# Patient Record
(demographics unavailable — no encounter records)

---

## 2024-11-13 NOTE — PHYSICAL EXAM
[Well Developed] : well developed [Well Nourished] : well nourished [Ill-Appearing] : ill-appearing [Normal Conjunctiva] : normal conjunctiva [Normal Venous Pressure] : normal venous pressure [No Carotid Bruit] : no carotid bruit [Normal S1, S2] : normal S1, S2 [No Murmur] : no murmur [No Rub] : no rub [No Gallop] : no gallop [Clear Lung Fields] : clear lung fields [Good Air Entry] : good air entry [No Respiratory Distress] : no respiratory distress  [Soft] : abdomen soft [Non Tender] : non-tender [No Masses/organomegaly] : no masses/organomegaly [Normal Bowel Sounds] : normal bowel sounds [Normal Gait] : normal gait [No Edema] : no edema [No Cyanosis] : no cyanosis [No Clubbing] : no clubbing [No Rash] : no rash [Moves all extremities] : moves all extremities [No Focal Deficits] : no focal deficits [Normal Speech] : normal speech [Alert and Oriented] : alert and oriented [Normal memory] : normal memory

## 2024-11-13 NOTE — DISCUSSION/SUMMARY
[FreeTextEntry1] : stable exam CAD stable, h/o pci, continue secondary prevention HTN stable on meds Lipids stable on statin [EKG obtained to assist in diagnosis and management of assessed problem(s)] : EKG obtained to assist in diagnosis and management of assessed problem(s)

## 2024-12-18 NOTE — CARDIOLOGY SUMMARY
[de-identified] : 10/28/21 NST: mod size anterior wall defect (new compared to 9/2020 normal perfusion), EF 43%, anterior hypokinesis at rest. Stress EKG 0-0.5 mm upsloping ST depression (leads not specified)  [de-identified] : 11/20/24 TTE: EF 30-35%, global LV hypokinesis, mildly dilated LV (LVIDd 5.2cm), Normal RV size and function. Aortic sclerosis without significant stenosis. IVC normal in size and collapsing.  8/7/24 TTE: LVEF 49%, mildly reduced systolic function, mild-mod MR, LA mildly dilated, mild AR  10/21/21 TTE: LVEF 41%, global hypokinesis, LVH, LV Diastolic Dysfunction, mod MR, mild-mod AR, normal RVSF, RVSP 32 mm Hg, no pericardial effusion. 10/21/21 TTE: LVEF 41%, global hypokinesis, LVH, LV Diastolic Dysfunction, mod MR, mild-mod AR, normal RVSF, RVSP 32 mm Hg, no pericardial effusion.  10/21/21 TTE: LVEF 41%, global hypokinesis, LVH, LV Diastolic Dysfunction, mod MR, mild-mod AR, normal RVSF, RVSP 32 mm Hg, no pericardial effusion.  10/21/21 TTE: LVEF 41%, global hypokinesis, LVH, LV Diastolic Dysfunction, mod MR, mild-mod AR, normal RVSF, RVSP 32 mm Hg, no pericardial effusion.  [de-identified] :  11/22/24 LHC: normal LM, 95% ISR of mLAD, 30% Cx, mild diffuse disease of RCCA, LVEF 40%, LVEDP 2 mmHg  11/22/24 RHC: /69/89 HR 82, RA 2, PA 27/8/15, PCWP 3, AO 95%, PA 60%, CO/CI (F) 3.49/2.05, SVR 1990 dsc, PVR 3.44 posey  11/22/21 LHC: Non-obstructive CAD. Patent p/mLAD stents, mLAD 30-50% (iFR 0.91), LCx mild disease, RCA mild disease; LVEDP 27mmHg. R CFA s/p angioseal

## 2024-12-18 NOTE — ASSESSMENT
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 He is ACC/AHA Stage C, NYHA Class III and euvolemic but mistakenly stopped GDMT. I have recommended the followin. HFrEF - Stable and compensated. Recent decline of LVEF likely in setting of ischemia, now revascularized. Resume Entresto 24-26 mg BID, Farxiga 10 mg QD and Spironolactone 12.5 mg QD as prescribed. Continue Toprol XL 50 mg QD. No loop diuretic requirement. Will determine need for primary prevention device based on cardiac function once on max tolerated GDMT. Labs from  with K 4.6, Cr 1.5 and pro-BNP from  was 2600. Labs today with K 4.6, Cr 1.6 and pro-BNP 3,080 (from 2,600 in November). Labs in one week.   2. CAD - No symptoms of active ischemia. Instructed to resume Brlinta. Continue ASA and Statin. Also on above BB, ARB and MRA. Follows with Dr. Bazzi.   3. Bradycardia - Episodes of bradycardia noted while hospitalized. HR in office today 70s on Toprol XL 50 mg QD. Plans to turn in MCOT . Scheduled to follow-up with EP, Dr. Bagley in February.   4. SVT - Underwent AVNRT ablation in . Scheduled to follow-up with EP as above.   5. CKD - baseline Cr 1.3-1.4. Labs today with Cr of 1.6. Resuming SGLT2-i as above to delay further progression.  6. Follow-up with the HF ACP in 2 weeks and Dr. Shay in 4-6 weeks.

## 2024-12-18 NOTE — PHYSICAL EXAM
[No Xanthelasma] : no xanthelasma [Soft] : abdomen soft [Non Tender] : non-tender [Normal Bowel Sounds] : normal bowel sounds [Normal Radial B/L] : normal radial B/L [Normal] : alert and oriented, normal memory [de-identified] : No perioral cyanosis, MMM  [de-identified] : JVP 6-8 cm H2O, no HJR  [de-identified] : Warm peripherally

## 2024-12-18 NOTE — CARDIOLOGY SUMMARY
[de-identified] : 10/28/21 NST: mod size anterior wall defect (new compared to 9/2020 normal perfusion), EF 43%, anterior hypokinesis at rest. Stress EKG 0-0.5 mm upsloping ST depression (leads not specified)  [de-identified] : 11/20/24 TTE: EF 30-35%, global LV hypokinesis, mildly dilated LV (LVIDd 5.2cm), Normal RV size and function. Aortic sclerosis without significant stenosis. IVC normal in size and collapsing.  8/7/24 TTE: LVEF 49%, mildly reduced systolic function, mild-mod MR, LA mildly dilated, mild AR  10/21/21 TTE: LVEF 41%, global hypokinesis, LVH, LV Diastolic Dysfunction, mod MR, mild-mod AR, normal RVSF, RVSP 32 mm Hg, no pericardial effusion. 10/21/21 TTE: LVEF 41%, global hypokinesis, LVH, LV Diastolic Dysfunction, mod MR, mild-mod AR, normal RVSF, RVSP 32 mm Hg, no pericardial effusion.  10/21/21 TTE: LVEF 41%, global hypokinesis, LVH, LV Diastolic Dysfunction, mod MR, mild-mod AR, normal RVSF, RVSP 32 mm Hg, no pericardial effusion.  10/21/21 TTE: LVEF 41%, global hypokinesis, LVH, LV Diastolic Dysfunction, mod MR, mild-mod AR, normal RVSF, RVSP 32 mm Hg, no pericardial effusion.  [de-identified] :  11/22/24 LHC: normal LM, 95% ISR of mLAD, 30% Cx, mild diffuse disease of RCCA, LVEF 40%, LVEDP 2 mmHg  11/22/24 RHC: /69/89 HR 82, RA 2, PA 27/8/15, PCWP 3, AO 95%, PA 60%, CO/CI (F) 3.49/2.05, SVR 1990 dsc, PVR 3.44 posey  11/22/21 LHC: Non-obstructive CAD. Patent p/mLAD stents, mLAD 30-50% (iFR 0.91), LCx mild disease, RCA mild disease; LVEDP 27mmHg. R CFA s/p angioseal

## 2024-12-18 NOTE — HISTORY OF PRESENT ILLNESS
[FreeTextEntry1] : Mr. Escobar is a 82 year old man with HFrEF (LVIDd 5.2 cm, LVEF 30-35%), CAD c/b MI s/p DARIUSZ (2006 and 11/2024), SVT s/p AVNRT ablation (3/6/23), HTN, CKD (b/l Cr 1.3-1.4), carotid artery stenosis and anxiety/depression who presents for discharge follow-up.   Hospitalized 11/20 - 12/5/24 at [facility] for SOB after recent flu like symptoms, found to be hypertensive and with pulmonary edema. Historically with midrange LVEF but TTE revealing reduction to 30-35%. He was diuresed but developed ROSA thought to be in the setting of overdiuresis. L/RHC 11/22 revealing 95% ISR of mLAD and underwent DARIUSZ x1. RHC with low filling pressures and Milena CI of 2.05 in the setting of elevated SVR of 1990. He was discharged to [facility] at a weight of 156.4 lbs on Entresto 24-26 mg BID, Toprol XL 50 mg QD, Farxiga 10 mg QD and Spironolactone 25 mg QD. He was also placed on MCOT on discharge for episodes of bradycardia with near syncope.   He returned home from rehab 12/11 and notably, did not continue Entresto, Farxiga, Spironolactone and Brilinta as he had questions about new medications. He is taking remainder of medications as prescribed. Otherwise, he feels back to his baseline. Largely inactive but reports primary limitation on flat ground is from LE weakness. Notes dyspnea on inclines. He does not own a BP machine. Continues to wear MCOT and will be returning 12/26. On home scale, weight stable between 157-158 lbs. Denies CP, palpitations, orthopnea, PND, ABD distention and LE swelling.

## 2024-12-18 NOTE — HISTORY OF PRESENT ILLNESS
[FreeTextEntry1] : Mr. Escobar is a 82 year old man with HFrEF (LVIDd 5.2 cm, LVEF 30-35%), CAD c/b MI s/p DARIUSZ (2006 and 11/2024), SVT s/p AVNRT ablation (3/6/23), HTN, CKD (b/l Cr 1.3-1.4), carotid artery stenosis and anxiety/depression who presents for discharge follow-up.   Hospitalized 11/20 - 12/5/24 at [facility] for SOB after recent flu like symptoms, found to be hypertensive and with pulmonary edema. Historically with midrange LVEF but TTE revealing reduction to 30-35%. He was diuresed but developed ROSA thought to be in the setting of overdiuresis. L/RHC 11/22 revealing 95% ISR of mLAD and underwent DARIUSZ x1. RHC with low filling pressures and Milena CI of 2.05 in the setting of elevated SVR of 1990. He was discharged to [facility] at a weight of 156.4 lbs on Entresto 24-26 mg BID, Toprol XL 50 mg QD, Farxiga 10 mg QD and Spironolactone 25 mg QD. He was also placed on MCOT on discharge for episodes of bradycardia with near syncope.   He returned home from rehab 12/11 and notably, did not continue Entresto, Farxiga, Spironolactone and Brilinta as he had questions about new medications. He is taking remainder of medications as prescribed. Otherwise, he feels back to his baseline. Largely inactive but reports primary limitation on flat ground is from LE weakness. Notes dyspnea on inclines. He does not own a BP machine. Continues to wear MCOT and will be returning 12/26. On home scale, weight stable between 157-158 lbs. Denies CP, palpitations, orthopnea, PND, ABD distention and LE swelling.

## 2024-12-18 NOTE — PHYSICAL EXAM
[No Xanthelasma] : no xanthelasma [Soft] : abdomen soft [Non Tender] : non-tender [Normal Bowel Sounds] : normal bowel sounds [Normal Radial B/L] : normal radial B/L [Normal] : alert and oriented, normal memory [de-identified] : No perioral cyanosis, MMM  [de-identified] : JVP 6-8 cm H2O, no HJR  [de-identified] : Warm peripherally

## 2024-12-30 NOTE — PHYSICAL EXAM
[No Xanthelasma] : no xanthelasma [Soft] : abdomen soft [Non Tender] : non-tender [Normal Bowel Sounds] : normal bowel sounds [Normal Radial B/L] : normal radial B/L [Normal] : alert and oriented, normal memory [de-identified] : No perioral cyanosis, MMM  [de-identified] : JVP 6-8 cm H2O, no HJR  [de-identified] : Warm peripherally

## 2024-12-30 NOTE — PHYSICAL EXAM
[No Xanthelasma] : no xanthelasma [Soft] : abdomen soft [Non Tender] : non-tender [Normal Bowel Sounds] : normal bowel sounds [Normal Radial B/L] : normal radial B/L [Normal] : alert and oriented, normal memory [de-identified] : No perioral cyanosis, MMM  [de-identified] : JVP 6-8 cm H2O, no HJR  [de-identified] : Warm peripherally

## 2024-12-30 NOTE — CARDIOLOGY SUMMARY
[de-identified] : 10/28/21 NST: mod size anterior wall defect (new compared to 9/2020 normal perfusion), EF 43%, anterior hypokinesis at rest. Stress EKG 0-0.5 mm upsloping ST depression (leads not specified)  [de-identified] : 11/20/24 TTE: EF 30-35%, global LV hypokinesis, mildly dilated LV (LVIDd 5.2cm), Normal RV size and function. Aortic sclerosis without significant stenosis. IVC normal in size and collapsing.  8/7/24 TTE: LVEF 49%, mildly reduced systolic function, mild-mod MR, LA mildly dilated, mild AR  10/21/21 TTE: LVEF 41%, global hypokinesis, LVH, LV Diastolic Dysfunction, mod MR, mild-mod AR, normal RVSF, RVSP 32 mm Hg, no pericardial effusion. 10/21/21 TTE: LVEF 41%, global hypokinesis, LVH, LV Diastolic Dysfunction, mod MR, mild-mod AR, normal RVSF, RVSP 32 mm Hg, no pericardial effusion.  10/21/21 TTE: LVEF 41%, global hypokinesis, LVH, LV Diastolic Dysfunction, mod MR, mild-mod AR, normal RVSF, RVSP 32 mm Hg, no pericardial effusion.  10/21/21 TTE: LVEF 41%, global hypokinesis, LVH, LV Diastolic Dysfunction, mod MR, mild-mod AR, normal RVSF, RVSP 32 mm Hg, no pericardial effusion.  [de-identified] :  11/22/24 LHC: normal LM, 95% ISR of mLAD, 30% Cx, mild diffuse disease of RCCA, LVEF 40%, LVEDP 2 mmHg  11/22/24 RHC: /69/89 HR 82, RA 2, PA 27/8/15, PCWP 3, AO 95%, PA 60%, CO/CI (F) 3.49/2.05, SVR 1990 dsc, PVR 3.44 posey  11/22/21 LHC: Non-obstructive CAD. Patent p/mLAD stents, mLAD 30-50% (iFR 0.91), LCx mild disease, RCA mild disease; LVEDP 27mmHg. R CFA s/p angioseal

## 2024-12-30 NOTE — HISTORY OF PRESENT ILLNESS
[FreeTextEntry1] : HF-Dr. Shay Cards- Dr. Bazzi  Mr. 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   Hospitalized 11/20 - 12/5/24 at [facility] for SOB after recent flu like symptoms, found to be hypertensive and with pulmonary edema. Historically with midrange LVEF but TTE revealing reduction to 30-35%. He was diuresed but developed ROSA thought to be in the setting of overdiuresis. L/RHC 11/22 revealing 95% ISR of mLAD and underwent DARIUSZ x1. RHC with low filling pressures and Milena CI of 2.05 in the setting of elevated SVR of 1990. He was discharged to [facility] at a weight of 156.4 lbs on Entresto 24-26 mg BID, Toprol XL 50 mg QD, Farxiga 10 mg QD and Spironolactone 25 mg QD. He was also placed on MCOT on discharge for episodes of bradycardia with near syncope.   He confirms his medications are correct, but has not taken them today. He takes them at lunch. He lives on the [facility] and took a cab here today but plans to walk home. He can walk five blocks but will rest after 2 blocks and is limited largely by fatigue and rarely short of breath. Otherwise, he feels back to his baseline. Continues to notes dyspnea on inclines.  Denies CP, palpitations, orthopnea, PND, ABD distention and LE swelling. Completed MCOT on 12/26. On home scale, weight stable between 157-158 lbs. He does not own a BP machine. Lastly he limits his po hydration to the hospital provided water bottle <=1 L/day.             Attending Only

## 2024-12-30 NOTE — HISTORY OF PRESENT ILLNESS
[FreeTextEntry1] : HF-Dr. Shay Cards- Dr. Bazzi  Mr. 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   Hospitalized 11/20 - 12/5/24 at [facility] for SOB after recent flu like symptoms, found to be hypertensive and with pulmonary edema. Historically with midrange LVEF but TTE revealing reduction to 30-35%. He was diuresed but developed ROSA thought to be in the setting of overdiuresis. L/RHC 11/22 revealing 95% ISR of mLAD and underwent DARIUSZ x1. RHC with low filling pressures and Milena CI of 2.05 in the setting of elevated SVR of 1990. He was discharged to [facility] at a weight of 156.4 lbs on Entresto 24-26 mg BID, Toprol XL 50 mg QD, Farxiga 10 mg QD and Spironolactone 25 mg QD. He was also placed on MCOT on discharge for episodes of bradycardia with near syncope.   He confirms his medications are correct, but has not taken them today. He takes them at lunch. He lives on the [facility] and took a cab here today but plans to walk home. He can walk five blocks but will rest after 2 blocks and is limited largely by fatigue and rarely short of breath. Otherwise, he feels back to his baseline. Continues to notes dyspnea on inclines.  Denies CP, palpitations, orthopnea, PND, ABD distention and LE swelling. Completed MCOT on 12/26. On home scale, weight stable between 157-158 lbs. He does not own a BP machine. Lastly he limits his po hydration to the hospital provided water bottle <=1 L/day.

## 2024-12-30 NOTE — CARDIOLOGY SUMMARY
[de-identified] : 10/28/21 NST: mod size anterior wall defect (new compared to 9/2020 normal perfusion), EF 43%, anterior hypokinesis at rest. Stress EKG 0-0.5 mm upsloping ST depression (leads not specified)  [de-identified] : 11/20/24 TTE: EF 30-35%, global LV hypokinesis, mildly dilated LV (LVIDd 5.2cm), Normal RV size and function. Aortic sclerosis without significant stenosis. IVC normal in size and collapsing.  8/7/24 TTE: LVEF 49%, mildly reduced systolic function, mild-mod MR, LA mildly dilated, mild AR  10/21/21 TTE: LVEF 41%, global hypokinesis, LVH, LV Diastolic Dysfunction, mod MR, mild-mod AR, normal RVSF, RVSP 32 mm Hg, no pericardial effusion. 10/21/21 TTE: LVEF 41%, global hypokinesis, LVH, LV Diastolic Dysfunction, mod MR, mild-mod AR, normal RVSF, RVSP 32 mm Hg, no pericardial effusion.  10/21/21 TTE: LVEF 41%, global hypokinesis, LVH, LV Diastolic Dysfunction, mod MR, mild-mod AR, normal RVSF, RVSP 32 mm Hg, no pericardial effusion.  10/21/21 TTE: LVEF 41%, global hypokinesis, LVH, LV Diastolic Dysfunction, mod MR, mild-mod AR, normal RVSF, RVSP 32 mm Hg, no pericardial effusion.  [de-identified] :  11/22/24 LHC: normal LM, 95% ISR of mLAD, 30% Cx, mild diffuse disease of RCCA, LVEF 40%, LVEDP 2 mmHg  11/22/24 RHC: /69/89 HR 82, RA 2, PA 27/8/15, PCWP 3, AO 95%, PA 60%, CO/CI (F) 3.49/2.05, SVR 1990 dsc, PVR 3.44 posey  11/22/21 LHC: Non-obstructive CAD. Patent p/mLAD stents, mLAD 30-50% (iFR 0.91), LCx mild disease, RCA mild disease; LVEDP 27mmHg. R CFA s/p angioseal

## 2024-12-30 NOTE — ASSESSMENT
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 He is ACC/AHA Stage C, NYHA Class III and euvolemic, reassuringly his medications are correct today. I have recommended the followin. HFrEF - Stable and compensated. Recent decline of LVEF likely in setting of ischemia, now revascularized. Current regimen is Entresto 24-26 mg BID, Farxiga 10 mg QD and Spironolactone 12.5 mg QD and Toprol XL 50 mg QD. Today will increase Entresto to 49/51 mg BID. Will defer BB escalation until Holter has resulted given marked bradycardia during admission. No loop diuretic requirement. In fact, I encouraged him to drink more water (up to 2 L/day) Will determine need for primary prevention device based on cardiac function once on max tolerated GDMT. Referred to cardiac rehab today  (pt selection pending).  Labs : Na 140, K 4.4, Cl 109*, BUN 37, Cr 1.7, pro-BNP 3083-->3132. Check labs on 2025 after increasing Entresto.   2. CAD - No symptoms of active ischemia. Instructed to resume Brlinta. Continue ASA and Statin. Also on above BB, ARB and MRA. Follows with Dr. Bazzi.   3. Bradycardia - Episodes of bradycardia noted while hospitalized. HR in office today 70s on Toprol XL 50 mg QD. MCOT  pending will defer BB titration until resulted. Scheduled to follow-up with EP, Dr. Bagley in February.   4. SVT - Underwent AVNRT ablation in . Scheduled to follow-up with EP as above.   5. CKD - baseline Cr 1.3-1.4. Labs today with Cr of 1.6. Resuming SGLT2-i as above to delay further progression.  6. Complete labs on 2025. Follow-up with the HF ACP in 2 weeks and Dr. Shay in 4-6 weeks.

## 2025-01-08 NOTE — DISCUSSION/SUMMARY
[FreeTextEntry1] : stable exam/ chart and hospital records reviewed CAD  stable s/p LAD pci cont DAPT CMP- stable, no active chf, will repeat echo on f/u HTN stable Lipids stable

## 2025-01-15 NOTE — CARDIOLOGY SUMMARY
[de-identified] : 10/28/21 NST: mod size anterior wall defect (new compared to 9/2020 normal perfusion), EF 43%, anterior hypokinesis at rest. Stress EKG 0-0.5 mm upsloping ST depression (leads not specified)  [de-identified] : 11/20/24 TTE: EF 30-35%, global LV hypokinesis, mildly dilated LV (LVIDd 5.2cm), Normal RV size and function. Aortic sclerosis without significant stenosis. IVC normal in size and collapsing.  8/7/24 TTE: LVEF 49%, mildly reduced systolic function, mild-mod MR, LA mildly dilated, mild AR  10/21/21 TTE: LVEF 41%, global hypokinesis, LVH, LV Diastolic Dysfunction, mod MR, mild-mod AR, normal RVSF, RVSP 32 mm Hg, no pericardial effusion. 10/21/21 TTE: LVEF 41%, global hypokinesis, LVH, LV Diastolic Dysfunction, mod MR, mild-mod AR, normal RVSF, RVSP 32 mm Hg, no pericardial effusion.  10/21/21 TTE: LVEF 41%, global hypokinesis, LVH, LV Diastolic Dysfunction, mod MR, mild-mod AR, normal RVSF, RVSP 32 mm Hg, no pericardial effusion.  10/21/21 TTE: LVEF 41%, global hypokinesis, LVH, LV Diastolic Dysfunction, mod MR, mild-mod AR, normal RVSF, RVSP 32 mm Hg, no pericardial effusion.  [de-identified] :  11/22/24 LHC: normal LM, 95% ISR of mLAD, 30% Cx, mild diffuse disease of RCCA, LVEF 40%, LVEDP 2 mmHg  11/22/24 RHC: /69/89 HR 82, RA 2, PA 27/8/15, PCWP 3, AO 95%, PA 60%, CO/CI (F) 3.49/2.05, SVR 1990 dsc, PVR 3.44 posey  11/22/21 LHC: Non-obstructive CAD. Patent p/mLAD stents, mLAD 30-50% (iFR 0.91), LCx mild disease, RCA mild disease; LVEDP 27mmHg. R CFA s/p angioseal

## 2025-01-15 NOTE — PHYSICAL EXAM
[No Xanthelasma] : no xanthelasma [Soft] : abdomen soft [Non Tender] : non-tender [Normal Bowel Sounds] : normal bowel sounds [Normal Radial B/L] : normal radial B/L [Normal] : alert and oriented, normal memory [de-identified] : No perioral cyanosis, MMM  [de-identified] : JVP 6-8 cm H2O, no HJR  [de-identified] : Warm peripherally

## 2025-01-15 NOTE — CARDIOLOGY SUMMARY
[de-identified] : 10/28/21 NST: mod size anterior wall defect (new compared to 9/2020 normal perfusion), EF 43%, anterior hypokinesis at rest. Stress EKG 0-0.5 mm upsloping ST depression (leads not specified)  [de-identified] : 11/20/24 TTE: EF 30-35%, global LV hypokinesis, mildly dilated LV (LVIDd 5.2cm), Normal RV size and function. Aortic sclerosis without significant stenosis. IVC normal in size and collapsing.  8/7/24 TTE: LVEF 49%, mildly reduced systolic function, mild-mod MR, LA mildly dilated, mild AR  10/21/21 TTE: LVEF 41%, global hypokinesis, LVH, LV Diastolic Dysfunction, mod MR, mild-mod AR, normal RVSF, RVSP 32 mm Hg, no pericardial effusion. 10/21/21 TTE: LVEF 41%, global hypokinesis, LVH, LV Diastolic Dysfunction, mod MR, mild-mod AR, normal RVSF, RVSP 32 mm Hg, no pericardial effusion.  10/21/21 TTE: LVEF 41%, global hypokinesis, LVH, LV Diastolic Dysfunction, mod MR, mild-mod AR, normal RVSF, RVSP 32 mm Hg, no pericardial effusion.  10/21/21 TTE: LVEF 41%, global hypokinesis, LVH, LV Diastolic Dysfunction, mod MR, mild-mod AR, normal RVSF, RVSP 32 mm Hg, no pericardial effusion.  [de-identified] :  11/22/24 LHC: normal LM, 95% ISR of mLAD, 30% Cx, mild diffuse disease of RCCA, LVEF 40%, LVEDP 2 mmHg  11/22/24 RHC: /69/89 HR 82, RA 2, PA 27/8/15, PCWP 3, AO 95%, PA 60%, CO/CI (F) 3.49/2.05, SVR 1990 dsc, PVR 3.44 posey  11/22/21 LHC: Non-obstructive CAD. Patent p/mLAD stents, mLAD 30-50% (iFR 0.91), LCx mild disease, RCA mild disease; LVEDP 27mmHg. R CFA s/p angioseal

## 2025-01-15 NOTE — ASSESSMENT
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 He is ACC/AHA Stage C, NYHA Class III and euvolemic. I have recommended the followin. HFrEF - Stable and compensated. Recent decline of LVEF likely in setting of ischemia, now revascularized. Current regimen is Entresto 49/51 mg BID, Farxiga 10 mg QD and Spironolactone 12.5 mg QD and Toprol XL 50 mg BID (awaiting MCOT, will email Dr. Bagley to ensure escalation of BB is apropriate) Today will plan for repeat labs on low K diet and after fluid liberalization and will maximize ARNI based on those results. If K remains elevated will discontinue MRA in favor of ARNI escalation.  Will defer BB escalation until Holter has resulted given marked bradycardia during admission. No loop diuretic requirement. In fact, I encouraged him to drink more water (up to 2 L/day) Will determine need for primary prevention device based on cardiac function once on max tolerated GDMT. Referred to cardiac rehab today  (pt selection pending). Labs 2025: Na 141, K 5.1, Cl 108, CO2 17, BUN 44, Cr 1.91 (from 1.7), proBNP 3132-->1799. Check labs with Dr. Bagley on 2/3/2025 (lapslip provided).  2. CAD - No symptoms of active ischemia. Instructed to resume Brilinta. Continue ASA and Statin. Also on above BB, ARB and MRA. Follows with Dr. Bazzi.   3. Bradycardia - Episodes of bradycardia noted while hospitalized. HR in office today 60s on Toprol XL 50 mg BID. MCOT  pending will defer BB titration until resulted. Scheduled to follow-up with EP, Dr. Bagley in February. Emailed EP team today to ensure BB escalation is apropriate.   4. SVT - Underwent AVNRT ablation in . Scheduled to follow-up with EP as above (2/3/2025).   5. CKD - baseline Cr 1.3-1.4. Labs today with Cr of 1.9. Resuming SGLT2-i as above to delay further progression. Fluid liberalization encouraged without overdoing it. Can drink 2 L total daily fluids.   6. Hyperkalemia- K is 5.1 but notably using Ms. Dash often. Low K diet reinforced. He will repeat labs on 2/3/2025 when seeing Dr. Bagley. Labslip ordered. As above would favor ARNI escalation over the continuation of MRA.   7. Complete labs on 2/3/2025. Follow-up with the HF ACP in 2 weeks ( via telehealth) and Dr. Shay in 3/2025 after TTE on max doses of GDMT. Aurelia will speak to Dr. Bazzi about deferring TTE.

## 2025-01-15 NOTE — HISTORY OF PRESENT ILLNESS
[FreeTextEntry1] : HF-Dr. Shay Cards- Dr. Bazzi  Mr. 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   Hospitalized 11/20 - 12/5/24 at [facility] for SOB after recent flu like symptoms, found to be hypertensive and with pulmonary edema. Historically with midrange LVEF but TTE revealing reduction to 30-35%. He was diuresed but developed ROSA thought to be in the setting of overdiuresis. L/RHC 11/22 revealing 95% ISR of mLAD and underwent DARIUSZ x1. RHC with low filling pressures and Milena CI of 2.05 in the setting of elevated SVR of 1990. He was discharged to [facility] at a weight of 156.4 lbs on Entresto 24-26 mg BID, Toprol XL 50 mg QD, Farxiga 10 mg QD and Spironolactone 25 mg QD. He was also placed on MCOT on discharge for episodes of bradycardia with near syncope.   ===== He confirms his medications and but is now taking metoprolol twice? daily but awaiting holter results. Completed MCOT on 12/26 and will follow with EP. Labs did result and demonstrates a rise in Cr now 1.9 with a K 5.1. Admits he has been utilizing Ms. Clay daily to limit salt intake and remains on fluid restrictions.   He lives on the [facility] and took a cab here today but sometimes walks home. He can walk five blocks but will rest after 2 blocks and is limited largely by fatigue and rarely short of breath. Otherwise, he feels back to his baseline. Continues to notes dyspnea on inclines.   Denies CP, palpitations, orthopnea, PND, ABD distention and LE swelling.. On home scale, weight stable between 157-158 lbs. He does not own a BP machine. Lastly he continues to limits his po hydration to the hospital provided water bottle <=1 L/day.

## 2025-01-15 NOTE — HISTORY OF PRESENT ILLNESS
[FreeTextEntry1] : HF-Dr. Shay Cards- Dr. Bazzi  Mr. 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   Hospitalized 11/20 - 12/5/24 at [facility] for SOB after recent flu like symptoms, found to be hypertensive and with pulmonary edema. Historically with midrange LVEF but TTE revealing reduction to 30-35%. He was diuresed but developed ROSA thought to be in the setting of overdiuresis. L/RHC 11/22 revealing 95% ISR of mLAD and underwent DARIUSZ x1. RHC with low filling pressures and Milena CI of 2.05 in the setting of elevated SVR of 1990. He was discharged to [facility] at a weight of 156.4 lbs on Entresto 24-26 mg BID, Toprol XL 50 mg QD, Farxiga 10 mg QD and Spironolactone 25 mg QD. He was also placed on MCOT on discharge for episodes of bradycardia with near syncope.   ===== He confirms his medications and but is now taking metoprolol twice? daily but awaiting holter results. Completed MCOT on 12/26 and will follow with EP. Labs did result and demonstrates a rise in Cr now 1.9 with a K 5.1. Admits he has been utilizing Ms. Clay daily to limit salt intake and remains on fluid restrictions.   He lives on the [facility] and took a cab here today but sometimes walks home. He can walk five blocks but will rest after 2 blocks and is limited largely by fatigue and rarely short of breath. Otherwise, he feels back to his baseline. Continues to notes dyspnea on inclines.   Denies CP, palpitations, orthopnea, PND, ABD distention and LE swelling.. On home scale, weight stable between 157-158 lbs. He does not own a BP machine. Lastly he continues to limits his po hydration to the hospital provided water bottle <=1 L/day.

## 2025-01-15 NOTE — PHYSICAL EXAM
[No Xanthelasma] : no xanthelasma [Soft] : abdomen soft [Non Tender] : non-tender [Normal Bowel Sounds] : normal bowel sounds [Normal Radial B/L] : normal radial B/L [Normal] : alert and oriented, normal memory [de-identified] : No perioral cyanosis, MMM  [de-identified] : JVP 6-8 cm H2O, no HJR  [de-identified] : Warm peripherally

## 2025-02-03 NOTE — PHYSICAL EXAM
[Normal Conjunctiva] : normal conjunctiva [Normal Venous Pressure] : normal venous pressure [No Carotid Bruit] : no carotid bruit [Normal] : normal S1, S2, no murmur, no rub, no gallop [Clear Lung Fields] : clear lung fields [Good Air Entry] : good air entry [No Respiratory Distress] : no respiratory distress  [Soft] : abdomen soft [Non Tender] : non-tender [No Edema] : no edema [Moves all extremities] : moves all extremities [No Focal Deficits] : no focal deficits [Alert and Oriented] : alert and oriented [Normal memory] : normal memory

## 2025-02-04 NOTE — END OF VISIT
[FreeTextEntry3] : I, Dr. Vivar, personally performed the evaluation and management (E/M) services for this new patient. That E/M includes conducting the initial examination, assessing all conditions, and establishing the plan of care. Today, my fellow, Junior Francis, was here to observe my evaluation and management services for this patient to be followed going forward.

## 2025-02-04 NOTE — REVIEW OF SYSTEMS
[Fever] : no fever [Chills] : no chills [SOB] : no shortness of breath [Dyspnea on exertion] : not dyspnea during exertion [Chest Discomfort] : no chest discomfort [Lower Ext Edema] : no extremity edema [Leg Claudication] : no intermittent leg claudication [Palpitations] : no palpitations [Syncope] : no syncope [Cough] : no cough [Wheezing] : no wheezing [Abdominal Pain] : no abdominal pain [Nausea] : no nausea [Vomiting] : no vomiting

## 2025-02-04 NOTE — HISTORY OF PRESENT ILLNESS
[FreeTextEntry1] : Jose Daniel Escobar is an 82 year old man with SVT, CAD s/p PCI, HTN, HLD, BPH, ureteral stones (s/p B/L stents 7/6/2021), carotid artery disease and anxiety/depression who presents to [facility] care. He developed sudden onset chest tightness on the morning of 12/23 which prompted him to present to Dr. morales's office for evaluation. He was found to be in a narrow complex tachycardia and was taken by EMS to the [facility] ED. He was given 6mg of adenosine followed by 12mg by EMS, which terminated the tachycardia to sinus tachycardia. He was found to be COVID (+) and was admitted for further workup. He had not further episodes of SVT while at [facility] and was discharged on 12/24. 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  Prior EP care with Dr. Vivar.   Interval history significant for [facility] 11/22/24 with successful DARIUSZ LAD ISR.  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  Telemetry notable for short runs of likely AT and frequent episodes of symptomatic bradycardia.  Buddy episodes were reproducible when he bends over and preceded by severe SOB/tachypnea and desaturation down to 70s.  Bradycardia resolved when respiratory distress resolved.  He denies any dizziness, presyncope/syncope.    ZioXT 12/12 - 12/26/24: SR (46-), symptoms during NSR, NSVT, non sustained SVT (longest 20 seconds)

## 2025-02-04 NOTE — HISTORY OF PRESENT ILLNESS
[FreeTextEntry1] : Jose Daniel Escobar is an 82 year old man with SVT, CAD s/p PCI, HTN, HLD, BPH, ureteral stones (s/p B/L stents 7/6/2021), carotid artery disease and anxiety/depression who presents to [facility] care. He developed sudden onset chest tightness on the morning of 12/23 which prompted him to present to Dr. morales's office for evaluation. He was found to be in a narrow complex tachycardia and was taken by EMS to the [facility] ED. He was given 6mg of adenosine followed by 12mg by EMS, which terminated the tachycardia to sinus tachycardia. He was found to be COVID (+) and was admitted for further workup. He had not further episodes of SVT while at [facility] and was discharged on 12/24. 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  Prior EP care with Dr. Vivar.   Interval history significant for [facility] 11/22/24 with successful DARIUSZ LAD ISR.  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  Telemetry notable for short runs of likely AT and frequent episodes of symptomatic bradycardia.  Buddy episodes were reproducible when he bends over and preceded by severe SOB/tachypnea and desaturation down to 70s.  Bradycardia resolved when respiratory distress resolved.  He denies any dizziness, presyncope/syncope.    ZioXT 12/12 - 12/26/24: SR (46-), symptoms during NSR, NSVT, non sustained SVT (longest 20 seconds)

## 2025-02-04 NOTE — CARDIOLOGY SUMMARY
[de-identified] : 2/3/25 SR @ 70 bpm, first deg AVB 11/7/23 SR with inc RBBB, PVCs  5/2/23 Sinus  Rhythm @ 63 bpm,  Incomplete right bundle branch block. 02/14/23: sinus rhythm with iRBBB [de-identified] : TTE 1/29/25 1. Moderately reduced left ventricular systolic function, ejection fractin \R\ 35-40%.  2. Global left ventricular hypokinesis.  3. With echocontrast imaging, there is no evidence of left ventricular thrombus.  4. Normal left ventricular size.  5. Normal right ventricular size and systolic function.  6. Normal atria.  7. Aortic sclerosis without significant stenosis.  8. Mild aortic regurgitation.  9. No pericardial effusion. 10. Compared to the previous TTE performed on 11/20/2024, left ventricular function appears slighly improved.  TTE 10/21/21: LVEF 41% with global hypokinesis, LVH, diastolic dysfunction, normal RV size and function, normal LA, nomral RA< mild-mod AI, mod MR with mild MAC, mild TR, trace PI, no pericardial effusion.

## 2025-02-04 NOTE — ADDENDUM
[FreeTextEntry1] : I, Christina Al, am scribing for and the presence of Dr. Bagley the following sections: HPI, PMH,Family/social history, ROS, Physical Exam, Assessment / Plan.   I, Moses Bagley, personally performed the services described in the documentation, reviewed the documentation recorded by the scribe in my presence and it accurately and completely records my words and actions.

## 2025-02-04 NOTE — DISCUSSION/SUMMARY
[FreeTextEntry1] : Jose Daniel Escobar is an 82 year old man with CAD s/p PCI, HTN, HLD, BPH, ureteral stones (s/p B/L stents 7/6/2021), carotid artery disease, anxiety/depression, SVT s/p AVRNT ablation (3/6/23, Dr. Vivar) and bradycardia who presents for follow up  1.  Bradycardia No significant bradycardia on recent ambulatory telemetry Episodes during hospitalization 2/2 respiratory distress vs. vagal component when bending over.  Continue to defer PPM placement at this time  2.  SVT Maintaining sinus rhythm  Non sustained SVT noted on LTM Continue Carvedilol for rate control  F/U 6 months, sooner as needed

## 2025-02-04 NOTE — CARDIOLOGY SUMMARY
[de-identified] : 2/3/25 SR @ 70 bpm, first deg AVB 11/7/23 SR with inc RBBB, PVCs  5/2/23 Sinus  Rhythm @ 63 bpm,  Incomplete right bundle branch block. 02/14/23: sinus rhythm with iRBBB [de-identified] : TTE 1/29/25 1. Moderately reduced left ventricular systolic function, ejection fractin \R\ 35-40%.  2. Global left ventricular hypokinesis.  3. With echocontrast imaging, there is no evidence of left ventricular thrombus.  4. Normal left ventricular size.  5. Normal right ventricular size and systolic function.  6. Normal atria.  7. Aortic sclerosis without significant stenosis.  8. Mild aortic regurgitation.  9. No pericardial effusion. 10. Compared to the previous TTE performed on 11/20/2024, left ventricular function appears slighly improved.  TTE 10/21/21: LVEF 41% with global hypokinesis, LVH, diastolic dysfunction, normal RV size and function, normal LA, nomral RA< mild-mod AI, mod MR with mild MAC, mild TR, trace PI, no pericardial effusion.

## 2025-02-12 NOTE — HISTORY OF PRESENT ILLNESS
[FreeTextEntry1] : HF-Dr. Shay Cards- Dr. Bazzi  Mr. 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   Hospitalized 11/20 - 12/5/24 at [facility] for SOB after recent flu like symptoms, found to be hypertensive and with pulmonary edema. Historically with midrange LVEF but TTE revealing reduction to 30-35%. He was diuresed but developed ROSA thought to be in the setting of overdiuresis. L/RHC 11/22 revealing 95% ISR of mLAD and underwent DARIUSZ x1. RHC with low filling pressures and Milena CI of 2.05 in the setting of elevated SVR of 1990. He was discharged to [facility] at a weight of 156.4 lbs on Entresto 24-26 mg BID, Toprol XL 50 mg QD, Farxiga 10 mg QD and Spironolactone 25 mg QD. He was also placed on MCOT on discharge for episodes of bradycardia with near syncope.   Briefly readmitted to [facility] 1/28-1/29/2025 presenting with acute shortness of breath and hypertension (SBP 200s), brought in by EMS, admitted for acute on chronic systolic heart failure. Likely in the setting of a " a few missed medication doses." He was resumed on medications and discharged on higher doses of ARNI including entresto 97/103 mg BID, [place] 12.5 mg TIW (as per HF notes, but discharge instructions inadvertently said once daily), transitioned from toprol to coreg 12.5 mg BID and farxiga 10 mg daily. Without a standing diuretic requirement.   ======== He presents today with his medication list(s). He forgot to take his medications today. [facility] confirms he is only taking spironolactone 25 mg every other day as the pills are too difficult to cut. He does monitor his BP at home.   He lives on the [facility] and took a cab here today but sometimes walks home. He can walk five blocks but will rest after 2 blocks and is limited largely by fatigue and rarely short of breath. Otherwise, he feels back to his baseline. Continues to notes dyspnea on inclines.   Denies CP, palpitations, orthopnea, PND, ABD distention and LE swelling.. On home scale, weight stable between ~157 lbs. He does not own a BP machine. Lastly he continues to limits his po hydration to the hospital provided water bottle <=1 L/day. Limits potassium rich snacks/foods.

## 2025-02-12 NOTE — CARDIOLOGY SUMMARY
[de-identified] : 12/12 - 12/26/24 ZioXT : SR predominant (min HR 39 bpm, max  bpm, avg HR 71 bpm), symptoms during NSR, NSVT (5 beats), non sustained SVT (longest 20 seconds).  [de-identified] : 10/28/21 NST: mod size anterior wall defect (new compared to 9/2020 normal perfusion), EF 43%, anterior hypokinesis at rest. Stress EKG 0-0.5 mm upsloping ST depression (leads not specified)  [de-identified] : 11/20/24 TTE: EF 30-35%, global LV hypokinesis, mildly dilated LV (LVIDd 5.2cm), Normal RV size and function. Aortic sclerosis without significant stenosis. IVC normal in size and collapsing.  8/7/24 TTE: LVEF 49%, mildly reduced systolic function, mild-mod MR, LA mildly dilated, mild AR  10/21/21 TTE: LVEF 41%, global hypokinesis, LVH, LV Diastolic Dysfunction, mod MR, mild-mod AR, normal RVSF, RVSP 32 mm Hg, no pericardial effusion. 10/21/21 TTE: LVEF 41%, global hypokinesis, LVH, LV Diastolic Dysfunction, mod MR, mild-mod AR, normal RVSF, RVSP 32 mm Hg, no pericardial effusion.  10/21/21 TTE: LVEF 41%, global hypokinesis, LVH, LV Diastolic Dysfunction, mod MR, mild-mod AR, normal RVSF, RVSP 32 mm Hg, no pericardial effusion.  10/21/21 TTE: LVEF 41%, global hypokinesis, LVH, LV Diastolic Dysfunction, mod MR, mild-mod AR, normal RVSF, RVSP 32 mm Hg, no pericardial effusion.  [de-identified] :  11/22/24 LHC: normal LM, 95% ISR of mLAD, 30% Cx, mild diffuse disease of RCCA, LVEF 40%, LVEDP 2 mmHg  11/22/24 RHC: /69/89 HR 82, RA 2, PA 27/8/15, PCWP 3, AO 95%, PA 60%, CO/CI (F) 3.49/2.05, SVR 1990 dsc, PVR 3.44 posey  11/22/21 LHC: Non-obstructive CAD. Patent p/mLAD stents, mLAD 30-50% (iFR 0.91), LCx mild disease, RCA mild disease; LVEDP 27mmHg. R CFA s/p angioseal

## 2025-02-12 NOTE — ASSESSMENT
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 He is ACC/AHA Stage C, NYHA Class III and euvolemic. I have recommended the followin. HFrEF - Stable and compensated. Recent decline of LVEF likely in setting of ischemia, now revascularized. Current regimen is Entresto 97/103 mg BID, Farxiga 10 mg QD and Spironolactone 25 mg QOD and  Coreg 12.5 mg BID. He has not yet taken medications today and has been educated to take his medications with a meal in the morning. Will plan to escalate BB at follow up if BP allows. Will determine need for primary prevention device based on cardiac function once on max tolerated GDMT. Referred to cardiac rehab today  and again 2/10/2025 based on ICM /HFrEF and recent PCI (pt selection pending). Labs at dicharge 2025: Na 140, K 3.6, Cl 107, Co2 21, BUN 35, Cr 1.36, proBNP on admission () 9211. Checking labs today.   2. CAD - No symptoms of active ischemia. Continue with Brilinta. Continue ASA and Statin. Also on above BB, ARB and MRA. Follows with Dr. Bazzi.   3. Bradycardia - Episodes of bradycardia noted while hospitalized. Following with EP, Dr. Bagley and completed ambulatory Holter. Emailed EP team and discussed BB escalation is appropriate.   4. SVT - Underwent AVNRT ablation in . Following w/ EP/Dr. Bagley. Completed Holter. Non sustained SVT (longest 20 seconds)  5. CKD - baseline Cr 1.3-1.4. Continue SGLT2-i as above to delay further progression. Fluid liberalization encouraged without overdoing it. Can drink 2 L total daily fluids.   6. Hyperkalemia- Monitoring closely w/ repat labs today. Using Ms. Dash often. Low K diet reinforced.   7. Follow-up with Dr. Shay in 3/7/2025.

## 2025-02-12 NOTE — PHYSICAL EXAM
[No Xanthelasma] : no xanthelasma [Soft] : abdomen soft [Non Tender] : non-tender [Normal Bowel Sounds] : normal bowel sounds [Normal Radial B/L] : normal radial B/L [Normal] : alert and oriented, normal memory [de-identified] : No perioral cyanosis, MMM  [de-identified] : JVP 6 cm H2O, no HJR  [de-identified] : unable to appreciate HSM or masses due to body habitus [de-identified] : Warm peripherally

## 2025-02-12 NOTE — PHYSICAL EXAM
[No Xanthelasma] : no xanthelasma [Soft] : abdomen soft [Non Tender] : non-tender [Normal Bowel Sounds] : normal bowel sounds [Normal Radial B/L] : normal radial B/L [Normal] : alert and oriented, normal memory [de-identified] : No perioral cyanosis, MMM  [de-identified] : JVP 6 cm H2O, no HJR  [de-identified] : unable to appreciate HSM or masses due to body habitus [de-identified] : Warm peripherally

## 2025-02-12 NOTE — HISTORY OF PRESENT ILLNESS
[FreeTextEntry1] : HF-Dr. Shay Cards- Dr. Bazzi  Mr. 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   Hospitalized 11/20 - 12/5/24 at [facility] for SOB after recent flu like symptoms, found to be hypertensive and with pulmonary edema. Historically with midrange LVEF but TTE revealing reduction to 30-35%. He was diuresed but developed ROSA thought to be in the setting of overdiuresis. L/RHC 11/22 revealing 95% ISR of mLAD and underwent DARIUSZ x1. RHC with low filling pressures and Milena CI of 2.05 in the setting of elevated SVR of 1990. He was discharged to [facility] at a weight of 156.4 lbs on Entresto 24-26 mg BID, Toprol XL 50 mg QD, Farxiga 10 mg QD and Spironolactone 25 mg QD. He was also placed on MCOT on discharge for episodes of bradycardia with near syncope.   Briefly readmitted to [facility] 1/28-1/29/2025 presenting with acute shortness of breath and hypertension (SBP 200s), brought in by EMS, admitted for acute on chronic systolic heart failure. Likely in the setting of a " a few missed medication doses." He was resumed on medications and discharged on higher doses of ARNI including entresto 97/103 mg BID, [place] 12.5 mg TIW (as per HF notes, but discharge instructions inadvertently said once daily), transitioned from toprol to coreg 12.5 mg BID and farxiga 10 mg daily. Without a standing diuretic requirement.   ======== He presents today with his medication list(s). He forgot to take his medications today. [facility] confirms he is only taking spironolactone 25 mg every other day as the pills are too difficult to cut. He does monitor his BP at home.   He lives on the [facility] and took a cab here today but sometimes walks home. He can walk five blocks but will rest after 2 blocks and is limited largely by fatigue and rarely short of breath. Otherwise, he feels back to his baseline. Continues to notes dyspnea on inclines.   Denies CP, palpitations, orthopnea, PND, ABD distention and LE swelling.. On home scale, weight stable between ~157 lbs. He does not own a BP machine. Lastly he continues to limits his po hydration to the hospital provided water bottle <=1 L/day. Limits potassium rich snacks/foods.

## 2025-02-12 NOTE — CARDIOLOGY SUMMARY
[de-identified] : 12/12 - 12/26/24 ZioXT : SR predominant (min HR 39 bpm, max  bpm, avg HR 71 bpm), symptoms during NSR, NSVT (5 beats), non sustained SVT (longest 20 seconds).  [de-identified] : 10/28/21 NST: mod size anterior wall defect (new compared to 9/2020 normal perfusion), EF 43%, anterior hypokinesis at rest. Stress EKG 0-0.5 mm upsloping ST depression (leads not specified)  [de-identified] : 11/20/24 TTE: EF 30-35%, global LV hypokinesis, mildly dilated LV (LVIDd 5.2cm), Normal RV size and function. Aortic sclerosis without significant stenosis. IVC normal in size and collapsing.  8/7/24 TTE: LVEF 49%, mildly reduced systolic function, mild-mod MR, LA mildly dilated, mild AR  10/21/21 TTE: LVEF 41%, global hypokinesis, LVH, LV Diastolic Dysfunction, mod MR, mild-mod AR, normal RVSF, RVSP 32 mm Hg, no pericardial effusion. 10/21/21 TTE: LVEF 41%, global hypokinesis, LVH, LV Diastolic Dysfunction, mod MR, mild-mod AR, normal RVSF, RVSP 32 mm Hg, no pericardial effusion.  10/21/21 TTE: LVEF 41%, global hypokinesis, LVH, LV Diastolic Dysfunction, mod MR, mild-mod AR, normal RVSF, RVSP 32 mm Hg, no pericardial effusion.  10/21/21 TTE: LVEF 41%, global hypokinesis, LVH, LV Diastolic Dysfunction, mod MR, mild-mod AR, normal RVSF, RVSP 32 mm Hg, no pericardial effusion.  [de-identified] :  11/22/24 LHC: normal LM, 95% ISR of mLAD, 30% Cx, mild diffuse disease of RCCA, LVEF 40%, LVEDP 2 mmHg  11/22/24 RHC: /69/89 HR 82, RA 2, PA 27/8/15, PCWP 3, AO 95%, PA 60%, CO/CI (F) 3.49/2.05, SVR 1990 dsc, PVR 3.44 posey  11/22/21 LHC: Non-obstructive CAD. Patent p/mLAD stents, mLAD 30-50% (iFR 0.91), LCx mild disease, RCA mild disease; LVEDP 27mmHg. R CFA s/p angioseal

## 2025-02-26 NOTE — DISCUSSION/SUMMARY
[FreeTextEntry1] : stable exam CMP- stable, no active CHF, mild reduced LVEF, back to baseline prior to event CAD stable s/p pci, secondary prevention HTN stable on meds Lipids stable on statin

## 2025-03-07 NOTE — END OF VISIT
[FreeTextEntry3] : I, Dr. Shay, personally performed the evaluation and management (E/M) services for this established patient who presents today with (a) new problem(s)/exacerbation of (an) existing condition(s).  That E/M includes conducting the examination, assessing all new/exacerbated conditions, and establishing a new plan of care.  Today, my CORY, [employer], was here to observe my evaluation and management services for this new problem/exacerbated condition to be followed going forward.  [Time Spent: ___ minutes] : I have spent [unfilled] minutes of time on the encounter which excludes teaching and separately reported services.

## 2025-03-07 NOTE — END OF VISIT
[FreeTextEntry3] : I, Dr. Shay, personally performed the evaluation and management (E/M) services for this established patient who presents today with (a) new problem(s)/exacerbation of (an) existing condition(s).  That E/M includes conducting the examination, assessing all new/exacerbated conditions, and establishing a new plan of care.  Today, my CORY, [employer], was here to observe my evaluation and management services for this new problem/exacerbated condition to be followed going forward.  [Time Spent: ___ minutes] : I have spent [unfilled] minutes of time on the encounter which excludes teaching and separately reported services.

## 2025-03-07 NOTE — CARDIOLOGY SUMMARY
[de-identified] : 12/12 - 12/26/24 ZioXT : SR predominant (min HR 39 bpm, max  bpm, avg HR 71 bpm), symptoms during NSR, NSVT (5 beats), non sustained SVT (longest 20 seconds).  [de-identified] : 10/28/21 NST: mod size anterior wall defect (new compared to 9/2020 normal perfusion), EF 43%, anterior hypokinesis at rest. Stress EKG 0-0.5 mm upsloping ST depression (leads not specified)  [de-identified] : 02/26/25 TTE: LVIDd 5.2 cm, LVEF 48%, normal RV Size and function (TAPSE 2.2), normal biatrial size, mild AI, mild-mod MR, trace TR, est PASP 26 mmHg, pericardial fat pad anterior to RV cannot rule out trivial effusion, IVC not well visualized  11/20/24 TTE: EF 30-35%, global LV hypokinesis, mildly dilated LV (LVIDd 5.2cm), Normal RV size and function. Aortic sclerosis without significant stenosis. IVC normal in size and collapsing.  8/7/24 TTE: LVEF 49%, mildly reduced systolic function, mild-mod MR, LA mildly dilated, mild AR  10/21/21 TTE: LVEF 41%, global hypokinesis, LVH, LV Diastolic Dysfunction, mod MR, mild-mod AR, normal RVSF, RVSP 32 mm Hg, no pericardial effusion. [de-identified] :  11/22/24 LHC: normal LM, 95% ISR of mLAD, 30% Cx, mild diffuse disease of RCCA, LVEF 40%, LVEDP 2 mmHg  11/22/24 RHC: /69/89 HR 82, RA 2, PA 27/8/15, PCWP 3, AO 95%, PA 60%, CO/CI (F) 3.49/2.05, SVR 1990 dsc, PVR 3.44 posey  11/22/21 LHC: Non-obstructive CAD. Patent p/mLAD stents, mLAD 30-50% (iFR 0.91), LCx mild disease, RCA mild disease; LVEDP 27mmHg. R CFA s/p angioseal

## 2025-03-07 NOTE — REASON FOR VISIT
[Cardiac Failure] : cardiac failure [FreeTextEntry1] : PATIENT INSTRUCTIONS:  1. INCREASE the CARVEDILOL to 25 mg taken TWICE A DAY with food  2. Continue remainder of your medications as prescribed  3. Follow-up with the HF ACP in 2 weeks and with Dr. Shay in 4 months

## 2025-03-07 NOTE — HISTORY OF PRESENT ILLNESS
[FreeTextEntry1] : Mr. 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   He was initially hospitalized 11/20 - 12/5/24 at [facility] for SOB after recent flu like symptoms, found to be hypertensive and with pulmonary edema. Historically with midrange LVEF but TTE revealing reduction to 30-35%. He was diuresed but developed ROSA thought to be in the setting of overdiuresis. L/RHC 11/22 revealed 95% ISR of mLAD and he underwent DARIUSZ x1. RHC with low filling pressures and Milena CI of 2.05 in the setting of elevated SVR of 1990. He was discharged at a weight of 156.4 lbs on Entresto 24-26 mg BID, Toprol XL 50 mg QD, Farxiga 10 mg QD and Spironolactone 25 mg QD. He was also placed on MCOT on discharge for episodes of bradycardia with near syncope. Outpatient [facility] without significant bradycardia and followed-up with Dr. Bagley, thought was prior episodes were secondary to respiratory distress vs vagal component.   He had followed-up with the HF ACP team as scheduled but was readmitted shortly after by EMS from 1/28-1/29/25 with acute shortness of breath and hypertension (SBP 200s), found to be in ADHF in the setting of missed medications. He was resumed and optimized on GDMT. He has since followed-up with the HF team and [facility] team. Repeat TTE last week with improved LVEF of 48%.    ========  Feels he is doing well in recent weeks, slowly building exertional tolerance. He walks daily for exercise for 15-30 minutes. He can now walk 5 blocks before having to rest and on stairs able to climb two flights. BP in office is elevated at 156/87 but review of home log generally 120-130s/90s. Home weights stable between 152-155 lbs without a loop diuretic requirement. He is not interested in returning to cardiac rehab.   He denies CP, SOB at rest, orthopnea, PND, LH/dizziness, abdominal discomfort, palpitations, and syncope. His appetite is normal and his bowel and bladder habits are unchanged and normal for him. He does not have a device. He has been limiting fluid and sodium in his diet and taking his medications as directed. He does not use NSAIDs. He has not been admitted to the hospital or seen in the ER for HF in the interim.

## 2025-03-07 NOTE — DISCUSSION/SUMMARY
[FreeTextEntry1] : 82-year-old man with HFimpEF (LVIDd 5.2 cm, LVEF 48%), CAD c/b MI s/p DARIUSZ () c/b ISR s/p DARIUSZ (2024), SVT s/p AVNRT ablation (3/6/23), HTN, carotid artery stenosis, CKD and anxiety/depression. He is ACC/AHA Stage C, NYHA Class II and euvolemic but hypertensive. I have recommended the followin. HFrEF - Stable and compensated. Recent decline of LVEF likely in setting of ischemia, now revascularized. Increase Coreg to 25 mg BID. Continue Entresto 97/103 mg BID, Farxiga 10 mg QD and Spironolactone 25 mg QOD. He does not have a loop diuretic requirement. No longer meets criteria for primary prevention ICD and cardiac rehab. Labs from 2/10 with K 5, Cr 1.7 (eGFR 40) and pro-BNP 1700 (from 1800 and 3100).   2. CAD - No symptoms of active ischemia. Continue EC ASA, Brilinta and Statin. Also on above BB, ARB and MRA. Follows with Dr. Bazzi.   3. Bradycardia - Episodes of bradycardia noted when hospitalized in November. Ambulatory holter 2024 without significant bradycardia. Followed by Dr. Bagley, prior bradycardic episodes thought to be secondary to respiratory distress vs vagal component.  4. SVT - Underwent AVNRT ablation in . Holter 2024 with non sustained SVT (longest 20 seconds). Follow-up with Dr. Bagley.   5. CKD - baseline Cr 1.3-1.4 but more recently, since 2024 1.6-1.7. Continue SGLT2-i as above to delay further progression. Fluid liberalization encouraged without overdoing it. Can drink 2 L total daily fluids.   6. Follow-up with the HF ACP in 2 weeks and Dr. Shay in 4 months

## 2025-03-07 NOTE — PHYSICAL EXAM
[No Xanthelasma] : no xanthelasma [Soft] : abdomen soft [Non Tender] : non-tender [Normal Bowel Sounds] : normal bowel sounds [Normal Radial B/L] : normal radial B/L [Normal] : alert and oriented, normal memory [de-identified] : No perioral cyanosis, MMM  [de-identified] : JVP 6 cm H2O, no HJR  [de-identified] : unable to appreciate HSM or masses due to body habitus [de-identified] : Warm peripherally

## 2025-03-07 NOTE — PHYSICAL EXAM
[No Xanthelasma] : no xanthelasma [Soft] : abdomen soft [Non Tender] : non-tender [Normal Bowel Sounds] : normal bowel sounds [Normal Radial B/L] : normal radial B/L [Normal] : alert and oriented, normal memory [de-identified] : No perioral cyanosis, MMM  [de-identified] : JVP 6 cm H2O, no HJR  [de-identified] : unable to appreciate HSM or masses due to body habitus [de-identified] : Warm peripherally

## 2025-03-07 NOTE — CARDIOLOGY SUMMARY
[de-identified] : 12/12 - 12/26/24 ZioXT : SR predominant (min HR 39 bpm, max  bpm, avg HR 71 bpm), symptoms during NSR, NSVT (5 beats), non sustained SVT (longest 20 seconds).  [de-identified] : 10/28/21 NST: mod size anterior wall defect (new compared to 9/2020 normal perfusion), EF 43%, anterior hypokinesis at rest. Stress EKG 0-0.5 mm upsloping ST depression (leads not specified)  [de-identified] : 02/26/25 TTE: LVIDd 5.2 cm, LVEF 48%, normal RV Size and function (TAPSE 2.2), normal biatrial size, mild AI, mild-mod MR, trace TR, est PASP 26 mmHg, pericardial fat pad anterior to RV cannot rule out trivial effusion, IVC not well visualized  11/20/24 TTE: EF 30-35%, global LV hypokinesis, mildly dilated LV (LVIDd 5.2cm), Normal RV size and function. Aortic sclerosis without significant stenosis. IVC normal in size and collapsing.  8/7/24 TTE: LVEF 49%, mildly reduced systolic function, mild-mod MR, LA mildly dilated, mild AR  10/21/21 TTE: LVEF 41%, global hypokinesis, LVH, LV Diastolic Dysfunction, mod MR, mild-mod AR, normal RVSF, RVSP 32 mm Hg, no pericardial effusion. [de-identified] :  11/22/24 LHC: normal LM, 95% ISR of mLAD, 30% Cx, mild diffuse disease of RCCA, LVEF 40%, LVEDP 2 mmHg  11/22/24 RHC: /69/89 HR 82, RA 2, PA 27/8/15, PCWP 3, AO 95%, PA 60%, CO/CI (F) 3.49/2.05, SVR 1990 dsc, PVR 3.44 posey  11/22/21 LHC: Non-obstructive CAD. Patent p/mLAD stents, mLAD 30-50% (iFR 0.91), LCx mild disease, RCA mild disease; LVEDP 27mmHg. R CFA s/p angioseal

## 2025-03-07 NOTE — HISTORY OF PRESENT ILLNESS
[FreeTextEntry1] : Mr. 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   He was initially hospitalized 11/20 - 12/5/24 at [facility] for SOB after recent flu like symptoms, found to be hypertensive and with pulmonary edema. Historically with midrange LVEF but TTE revealing reduction to 30-35%. He was diuresed but developed ROSA thought to be in the setting of overdiuresis. L/RHC 11/22 revealed 95% ISR of mLAD and he underwent DARIUSZ x1. RHC with low filling pressures and Milena CI of 2.05 in the setting of elevated SVR of 1990. He was discharged at a weight of 156.4 lbs on Entresto 24-26 mg BID, Toprol XL 50 mg QD, Farxiga 10 mg QD and Spironolactone 25 mg QD. He was also placed on MCOT on discharge for episodes of bradycardia with near syncope. Outpatient [facility] without significant bradycardia and followed-up with Dr. Bagley, thought was prior episodes were secondary to respiratory distress vs vagal component.   He had followed-up with the HF ACP team as scheduled but was readmitted shortly after by EMS from 1/28-1/29/25 with acute shortness of breath and hypertension (SBP 200s), found to be in ADHF in the setting of missed medications. He was resumed and optimized on GDMT. He has since followed-up with the HF team and [facility] team. Repeat TTE last week with improved LVEF of 48%.    ========  Feels he is doing well in recent weeks, slowly building exertional tolerance. He walks daily for exercise for 15-30 minutes. He can now walk 5 blocks before having to rest and on stairs able to climb two flights. BP in office is elevated at 156/87 but review of home log generally 120-130s/90s. Home weights stable between 152-155 lbs without a loop diuretic requirement. He is not interested in returning to cardiac rehab.   He denies CP, SOB at rest, orthopnea, PND, LH/dizziness, abdominal discomfort, palpitations, and syncope. His appetite is normal and his bowel and bladder habits are unchanged and normal for him. He does not have a device. He has been limiting fluid and sodium in his diet and taking his medications as directed. He does not use NSAIDs. He has not been admitted to the hospital or seen in the ER for HF in the interim.

## 2025-03-23 NOTE — CARDIOLOGY SUMMARY
[de-identified] : 12/12 - 12/26/24 ZioXT : SR predominant (min HR 39 bpm, max  bpm, avg HR 71 bpm), symptoms during NSR, NSVT (5 beats), non sustained SVT (longest 20 seconds).  [de-identified] : 10/28/21 NST: mod size anterior wall defect (new compared to 9/2020 normal perfusion), EF 43%, anterior hypokinesis at rest. Stress EKG 0-0.5 mm upsloping ST depression (leads not specified)  [de-identified] : 02/26/25 TTE: LVIDd 5.2 cm, LVEF 48%, normal RV Size and function (TAPSE 2.2), normal biatrial size, mild AI, mild-mod MR, trace TR, est PASP 26 mmHg, pericardial fat pad anterior to RV cannot rule out trivial effusion, IVC not well visualized  11/20/24 TTE: EF 30-35%, global LV hypokinesis, mildly dilated LV (LVIDd 5.2cm), Normal RV size and function. Aortic sclerosis without significant stenosis. IVC normal in size and collapsing.  8/7/24 TTE: LVEF 49%, mildly reduced systolic function, mild-mod MR, LA mildly dilated, mild AR  10/21/21 TTE: LVEF 41%, global hypokinesis, LVH, LV Diastolic Dysfunction, mod MR, mild-mod AR, normal RVSF, RVSP 32 mm Hg, no pericardial effusion. [de-identified] :  11/22/24 LHC: normal LM, 95% ISR of mLAD, 30% Cx, mild diffuse disease of RCCA, LVEF 40%, LVEDP 2 mmHg  11/22/24 RHC: /69/89 HR 82, RA 2, PA 27/8/15, PCWP 3, AO 95%, PA 60%, CO/CI (F) 3.49/2.05, SVR 1990 dsc, PVR 3.44 posey  11/22/21 LHC: Non-obstructive CAD. Patent p/mLAD stents, mLAD 30-50% (iFR 0.91), LCx mild disease, RCA mild disease; LVEDP 27mmHg. R CFA s/p angioseal

## 2025-03-23 NOTE — PHYSICAL EXAM
[No Xanthelasma] : no xanthelasma [Soft] : abdomen soft [Non Tender] : non-tender [Normal Bowel Sounds] : normal bowel sounds [Normal Radial B/L] : normal radial B/L [Normal] : alert and oriented, normal memory [de-identified] : No perioral cyanosis, MMM  [de-identified] : JVP 6 cm H2O, no HJR  [de-identified] : unable to appreciate HSM or masses due to body habitus [de-identified] : Warm peripherally

## 2025-03-23 NOTE — DISCUSSION/SUMMARY
[FreeTextEntry1] : 82-year-old man with HFimpEF (LVIDd 5.2 cm, LVEF 48%), CAD c/b MI s/p DARIUSZ () c/b ISR s/p DARIUSZ (2024), SVT s/p AVNRT ablation (3/6/23), HTN, carotid artery stenosis, CKD and anxiety/depression. He is ACC/AHA Stage C, NYHA Class II and euvolemic . I have recommended the followin. HFrEF - Stable and compensated. Recent decline of LVEF likely in setting of ischemia, now revascularized. Continue Coreg to 25 mg BID. Continue Entresto 97/103 mg BID, Farxiga 10 mg QD and Spironolactone 25 mg QOD. He does not have a loop diuretic requirement. No longer meets criteria for primary prevention ICD and cardiac rehab. Labs from 2/10 with K 5, Cr 1.7 (eGFR 40) and pro-BNP 1700 (from 1800 and 3100). Enrolled in [facility] program.   2. CAD - No symptoms of active ischemia. Continue EC ASA, Brilinta and Statin. Also on above BB, ARB and MRA. Follows with Dr. Bazzi.   3. Bradycardia - Episodes of bradycardia noted when hospitalized in November. Ambulatory holter 2024 without significant bradycardia. Followed by Dr. Bagley, prior bradycardic episodes thought to be secondary to respiratory distress vs vagal component.  4. SVT - Underwent AVNRT ablation in . Holter 2024 with non sustained SVT (longest 20 seconds). Follow-up with Dr. Bagley.   5. CKD - baseline Cr 1.3-1.4 but more recently, since 2024 1.6-1.7. Continue SGLT2-i as above to delay further progression. Fluid liberalization encouraged without overdoing it. Can drink 2 L total daily fluids.   6. Follow-up with Dr. Shay in 4 months as scheduled on 2025

## 2025-03-23 NOTE — HISTORY OF PRESENT ILLNESS
[FreeTextEntry1] : Mr. 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   He was initially hospitalized 11/20 - 12/5/24 at [facility] for SOB after recent flu like symptoms, found to be hypertensive and with pulmonary edema. Historically with midrange LVEF but TTE revealing reduction to 30-35%. He was diuresed but developed ROSA thought to be in the setting of overdiuresis. L/RHC 11/22 revealed 95% ISR of mLAD and he underwent DARIUSZ x1. RHC with low filling pressures and Milena CI of 2.05 in the setting of elevated SVR of 1990. He was discharged at a weight of 156.4 lbs on Entresto 24-26 mg BID, Toprol XL 50 mg QD, Farxiga 10 mg QD and Spironolactone 25 mg QD. He was also placed on MCOT on discharge for episodes of bradycardia with near syncope. Outpatient [facility] without significant bradycardia and followed-up with Dr. Bagley, thought was prior episodes were secondary to respiratory distress vs vagal component.   He had followed-up with the HF ACP team as scheduled but was readmitted shortly after by EMS from 1/28-1/29/25 with acute shortness of breath and hypertension (SBP 200s), found to be in ADHF in the setting of missed medications. He was resumed and optimized on GDMT. He has since followed-up with the HF team and [facility] team. Repeat TTE last week with improved LVEF of 48%.   ======== Mr. Escobar is in our [facility] pharmacy program. Familiar with all his medications today and administered all medications this morning.   Feels he is doing well in recent weeks, slowly building exertional tolerance. He walks daily for exercise for 15-30 minutes. He can now walk 5 blocks before having to rest and on stairs able to climb two flights. BP in office is well controlled, he took all his medications. Review of home log generally 100-140s (but taking readings prior to medications in the morning. Home weights stable between 152-155 lbs without a loop diuretic requirement. He is not interested in returning to cardiac rehab.   He denies CP, SOB at rest, orthopnea, PND, LH/dizziness, abdominal discomfort, palpitations, and syncope. His appetite is normal and his bowel and bladder habits are unchanged and normal for him. He does not have a device. He has been limiting fluid and sodium in his diet and taking his medications as directed. He does not use NSAIDs. He has not been admitted to the hospital or seen in the ER for HF in the interim.

## 2025-03-23 NOTE — CARDIOLOGY SUMMARY
[de-identified] : 12/12 - 12/26/24 ZioXT : SR predominant (min HR 39 bpm, max  bpm, avg HR 71 bpm), symptoms during NSR, NSVT (5 beats), non sustained SVT (longest 20 seconds).  [de-identified] : 10/28/21 NST: mod size anterior wall defect (new compared to 9/2020 normal perfusion), EF 43%, anterior hypokinesis at rest. Stress EKG 0-0.5 mm upsloping ST depression (leads not specified)  [de-identified] : 02/26/25 TTE: LVIDd 5.2 cm, LVEF 48%, normal RV Size and function (TAPSE 2.2), normal biatrial size, mild AI, mild-mod MR, trace TR, est PASP 26 mmHg, pericardial fat pad anterior to RV cannot rule out trivial effusion, IVC not well visualized  11/20/24 TTE: EF 30-35%, global LV hypokinesis, mildly dilated LV (LVIDd 5.2cm), Normal RV size and function. Aortic sclerosis without significant stenosis. IVC normal in size and collapsing.  8/7/24 TTE: LVEF 49%, mildly reduced systolic function, mild-mod MR, LA mildly dilated, mild AR  10/21/21 TTE: LVEF 41%, global hypokinesis, LVH, LV Diastolic Dysfunction, mod MR, mild-mod AR, normal RVSF, RVSP 32 mm Hg, no pericardial effusion. [de-identified] :  11/22/24 LHC: normal LM, 95% ISR of mLAD, 30% Cx, mild diffuse disease of RCCA, LVEF 40%, LVEDP 2 mmHg  11/22/24 RHC: /69/89 HR 82, RA 2, PA 27/8/15, PCWP 3, AO 95%, PA 60%, CO/CI (F) 3.49/2.05, SVR 1990 dsc, PVR 3.44 posey  11/22/21 LHC: Non-obstructive CAD. Patent p/mLAD stents, mLAD 30-50% (iFR 0.91), LCx mild disease, RCA mild disease; LVEDP 27mmHg. R CFA s/p angioseal

## 2025-03-23 NOTE — HISTORY OF PRESENT ILLNESS
[FreeTextEntry1] : Mr. 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   He was initially hospitalized 11/20 - 12/5/24 at [facility] for SOB after recent flu like symptoms, found to be hypertensive and with pulmonary edema. Historically with midrange LVEF but TTE revealing reduction to 30-35%. He was diuresed but developed ROSA thought to be in the setting of overdiuresis. L/RHC 11/22 revealed 95% ISR of mLAD and he underwent DARIUSZ x1. RHC with low filling pressures and Milena CI of 2.05 in the setting of elevated SVR of 1990. He was discharged at a weight of 156.4 lbs on Entresto 24-26 mg BID, Toprol XL 50 mg QD, Farxiga 10 mg QD and Spironolactone 25 mg QD. He was also placed on MCOT on discharge for episodes of bradycardia with near syncope. Outpatient [facility] without significant bradycardia and followed-up with Dr. Bagley, thought was prior episodes were secondary to respiratory distress vs vagal component.   He had followed-up with the HF ACP team as scheduled but was readmitted shortly after by EMS from 1/28-1/29/25 with acute shortness of breath and hypertension (SBP 200s), found to be in ADHF in the setting of missed medications. He was resumed and optimized on GDMT. He has since followed-up with the HF team and [facility] team. Repeat TTE last week with improved LVEF of 48%.   ======== Mr. Escobar is in our [facility] pharmacy program. Familiar with all his medications today and administered all medications this morning.   Feels he is doing well in recent weeks, slowly building exertional tolerance. He walks daily for exercise for 15-30 minutes. He can now walk 5 blocks before having to rest and on stairs able to climb two flights. BP in office is well controlled, he took all his medications. Review of home log generally 100-140s (but taking readings prior to medications in the morning. Home weights stable between 152-155 lbs without a loop diuretic requirement. He is not interested in returning to cardiac rehab.   He denies CP, SOB at rest, orthopnea, PND, LH/dizziness, abdominal discomfort, palpitations, and syncope. His appetite is normal and his bowel and bladder habits are unchanged and normal for him. He does not have a device. He has been limiting fluid and sodium in his diet and taking his medications as directed. He does not use NSAIDs. He has not been admitted to the hospital or seen in the ER for HF in the interim.

## 2025-03-23 NOTE — PHYSICAL EXAM
[No Xanthelasma] : no xanthelasma [Soft] : abdomen soft [Non Tender] : non-tender [Normal Bowel Sounds] : normal bowel sounds [Normal Radial B/L] : normal radial B/L [Normal] : alert and oriented, normal memory [de-identified] : No perioral cyanosis, MMM  [de-identified] : JVP 6 cm H2O, no HJR  [de-identified] : unable to appreciate HSM or masses due to body habitus [de-identified] : Warm peripherally

## 2025-07-02 NOTE — DISCUSSION/SUMMARY
[de-identified] : Chin on chest deformity, some flexibility. no overt neurologic disorder known, no known trauma, marked cervical kyphosis -given extensive medical history including recent MI in 11/2024 and multiple hospitalizations in past 6 months, would not recommend surgery at this time -recommend soft collar for comfort for ambulation to improve gaze level -recommend PT for cervical and thoracic musculature training -f/u with me in 6 months -all questions answered

## 2025-07-02 NOTE — DISCUSSION/SUMMARY
[FreeTextEntry1] : stable, labs in office CAD stable, h/o pci, secondary prevention, CHF- stable, ischemic, mild decreased LVEF, continue medical therapy HTN stable Lipids stable

## 2025-07-02 NOTE — DISCUSSION/SUMMARY
[de-identified] : Chin on chest deformity, some flexibility. no overt neurologic disorder known, no known trauma, marked cervical kyphosis -given extensive medical history including recent MI in 11/2024 and multiple hospitalizations in past 6 months, would not recommend surgery at this time -recommend soft collar for comfort for ambulation to improve gaze level -recommend PT for cervical and thoracic musculature training -f/u with me in 6 months -all questions answered
